# Patient Record
(demographics unavailable — no encounter records)

---

## 2025-07-14 NOTE — HISTORY OF PRESENT ILLNESS
[FreeTextEntry1] : EDUARDO CORADO Mar 1 1963  Language: Bengali # [id number] Yamil Date of First visit: 06/17/2025 Accompanied by: self Contact info: Referring Provider/PCP: Dr. Mckeon Fax: [phone]  CC/ Problem List: elevated PSA =============================================================================== FIRST VISIT / Summary: Very pleasant 62 year old M here for elevated PSA. No family hx of  malignancies. No hx of JIGAR, prostate MRI or biopsy. Minimal urinary complaints of seldom urinary urgency and nocturia 5x which is not bothersome. Overall content with voiding pattern. Not on any urinary medication Denies hematuria, dysuria, constipation IPSS 6 QOL 0 Verbalizes if holds BM for while when has BM expels some sperm  ------------------------------------------------------------------------------------------- INTERVAL VISITS: The patient's medications and allergies were reviewed and edited below. Dated 07/15/2025  Here for MRI prostate results. None seen. ===============================================================================  PMH: HLD  FHx: no  malignancies SocHx: non smoker, social Etoh, single, 1 child,   PSH: left inguinal hernia 2017  ROS: Review of Systems is as per HPI unless otherwise denoted below  =============================================================================== DATA: LABS (SELECTED):--------------------------------------------------------------------------------------------------- 4/30/2025 Cr 0.81, A1c 5.0, PSA 4.51, UA blood negative, nitrites negative, leukocyte trace, UA RBC 0-2 06/17/2025 PSA 5.29 with 17% free, UCx and G/c negative  RADS:-------------------------------------------------------------------------------------------------------------------   PATHOLOGY/CYTOLOGY:-------------------------------------------------------------------------------------------   VOIDING STUDIES: ----------------------------------------------------------------------------------------------------   STONE STUDIES: (Analysis/LLSA)----------------------------------------------------------------------------------   PROCEDURES: -----------------------------------------------------------------------------------------------    =============================================================================== PHYSICAL EXAM:   FOCUSED: ---------------------------------------------------------------------------------------------------------------- 06/17/2025 chaperone: KEREN Guzman JIGAR: prostate enlarged, 40cc, no nodules appreciated with right apical prominence  ======================================================================================= DISCUSSION: ======================================================================================= ASSESSMENT and PLAN  1. discharge -UCx, trichomonas, chlamydia/GC (negative)  2. elevated PSA -repeat PSA -prostate MRI then biopsy of any lesions -RTC in 1 month  ======================================================================================= 4g Thank you for allowing me to assist in the care of your patient. Should you have any questions please do not hesitate to reach out to me.   Erasmo Calvert MD       [facility]  [place] Office: [address], [address] [place] [address] T: [phone] F: [phone]  [place] Office: [address] [place] [address] T: [phone] F: [phone]